# Patient Record
Sex: MALE | Race: WHITE | NOT HISPANIC OR LATINO | URBAN - NONMETROPOLITAN AREA
[De-identification: names, ages, dates, MRNs, and addresses within clinical notes are randomized per-mention and may not be internally consistent; named-entity substitution may affect disease eponyms.]

---

## 2021-11-15 ENCOUNTER — IMPORTED ENCOUNTER (OUTPATIENT)
Dept: URBAN - NONMETROPOLITAN AREA CLINIC 1 | Facility: CLINIC | Age: 61
End: 2021-11-15

## 2021-11-15 PROBLEM — H25.813: Noted: 2021-11-15

## 2021-11-15 PROCEDURE — 99204 OFFICE O/P NEW MOD 45 MIN: CPT

## 2021-11-15 NOTE — PATIENT DISCUSSION
Cataract(s)-Visually significant cataract OU .-Cataract(s) causing symptomatic impairment of visual function not correctable with a tolerable change in glasses or contact lenses lighting or non-operative means resulting in specific activity limitations and/or participation restrictions including but not limited to reading viewing television driving or meeting vocational or recreational needs. -Expectation is clearer vision and functional improvement in symptoms as well as reduced glare disability after cataract removal.-Order IOLMaster and OPD today. -Recommend LRI OD/Toric OS based on today's OPD testing and lifestyle questionnaire.-All questions were answered regarding surgery including pre and post-op medications appointments activity restrictions and anesthetic usage.-The risks benefits and alternatives and special risk factors for the patient were discussed in detail including but not limited to: bleeding infection retinal detachment vitreous loss problems with the implant and possible need for additional surgery.-Although rare the possibility of complete vision loss was discussed.-The possible need for glasses post-operatively was discussed.-Order medical clearance exam based on history of HBP-Patient elects to proceed with cataract surgery OS . Will schedule at patient's convenience and re-evaluate OD  in the future. Discussed lens options. Qualifies for LRI OD & Toric OS. Discussed lens benefits & advised pt need for reading gls. Amblyopia OS discussed guarded prognosis 2nd w/ realistic [facility] expectations.  Start AT & Lotemax TID OU x 1 week & RTC for repeat krystle OU Pt elects LenSx OU

## 2021-11-23 ENCOUNTER — IMPORTED ENCOUNTER (OUTPATIENT)
Dept: URBAN - NONMETROPOLITAN AREA CLINIC 1 | Facility: CLINIC | Age: 61
End: 2021-11-23

## 2021-12-01 ENCOUNTER — IMPORTED ENCOUNTER (OUTPATIENT)
Dept: URBAN - NONMETROPOLITAN AREA CLINIC 1 | Facility: CLINIC | Age: 61
End: 2021-12-01

## 2021-12-01 PROBLEM — H25.813: Noted: 2021-12-01

## 2021-12-01 PROCEDURE — 99213 OFFICE O/P EST LOW 20 MIN: CPT

## 2021-12-02 PROBLEM — Z01.818: Noted: 2021-12-02

## 2021-12-02 PROBLEM — I10: Noted: 2021-12-02

## 2022-02-03 ENCOUNTER — CONSULTATION/EVALUATION (OUTPATIENT)
Dept: RURAL CLINIC 3 | Facility: CLINIC | Age: 62
End: 2022-02-03

## 2022-02-03 VITALS
HEIGHT: 68 IN | BODY MASS INDEX: 25.76 KG/M2 | SYSTOLIC BLOOD PRESSURE: 135 MMHG | HEART RATE: 84 BPM | WEIGHT: 170 LBS | DIASTOLIC BLOOD PRESSURE: 67 MMHG

## 2022-02-03 DIAGNOSIS — H25.13: ICD-10-CM

## 2022-02-03 PROCEDURE — 99214 OFFICE O/P EST MOD 30 MIN: CPT

## 2022-02-03 ASSESSMENT — TONOMETRY
OD_IOP_MMHG: 12
OS_IOP_MMHG: 12

## 2022-02-03 NOTE — PATIENT DISCUSSION
Cataract(s) -Visually significant cataract OU . -Cataract(s) causing symptomatic impairment of visual function not correctable with a tolerable change in glasses or contact lenses, lighting, or non-operative means resulting in specific activity limitations and/or participation restrictions including, but not limited to reading, viewing television, driving, or meeting vocational or recreational needs. -Expectation is clearer vision and functional improvement in symptoms as well as reduced glare disability after cataract removal. -Order IOLMaster and OPD today. -Recommend LRI OD/Toric OS based on today's OPD testing and lifestyle questionnaire. -All questions were answered regarding surgery, including pre and post-op medications, appointments, activity restrictions and anesthetic usage. -The risks, benefits and alternatives, and special risk factors for the patient, were discussed in detail, including but not limited to: bleeding, infection, retinal detachment, vitreous loss, problems with the implant, and possible need for additional surgery. -Although rare, the possibility of complete vision loss was discussed. -The possible need for glasses post-operatively was discussed. -Order medical clearance exam based on history of HBP -Patient elects to proceed with cataract surgery OS . Will schedule at patient's convenience and re-evaluate OD in the future. Discussed lens options. Qualifies for LRI OD & Toric OS. Discussed lens benefits & advised pt need for reading gls. Amblyopia OS, discussed guarded prognosis 2nd w/ realistic [facility] expectations.

## 2022-02-03 NOTE — PATIENT DISCUSSION
Medical Clearance -Medical clearance done today. -No outstanding concerns that would preclude surgery. -Patient is cleared to proceed with scheduled surgery.

## 2022-02-04 ASSESSMENT — VISUAL ACUITY
OS_SC: 20/400
OD_BAT: 20/50
OS_PH: 20/40
OD_SC: 20/400
OD_PAM: 20/70
OD_PH: 20/70
OS_BAT: 20/50
OS_AM: 20/30

## 2022-02-18 ENCOUNTER — SURGERY/PROCEDURE (OUTPATIENT)
Dept: RURAL CLINIC 4 | Facility: CLINIC | Age: 62
End: 2022-02-18

## 2022-02-18 ENCOUNTER — POST-OP (OUTPATIENT)
Dept: RURAL CLINIC 3 | Facility: CLINIC | Age: 62
End: 2022-02-18

## 2022-02-18 DIAGNOSIS — H25.13: ICD-10-CM

## 2022-02-18 DIAGNOSIS — Z98.42: ICD-10-CM

## 2022-02-18 PROCEDURE — 66982 XCAPSL CTRC RMVL CPLX WO ECP: CPT

## 2022-02-18 PROCEDURE — 99024 POSTOP FOLLOW-UP VISIT: CPT

## 2022-02-18 PROCEDURE — TORIC TORIC

## 2022-02-18 PROCEDURE — V2787 ASTIGMATISM-CORRECT FUNCTION: HCPCS

## 2022-02-18 ASSESSMENT — VISUAL ACUITY
OD_SC: 20/200
OS_SC: 20/400

## 2022-02-18 ASSESSMENT — TONOMETRY
OD_IOP_MMHG: 14
OS_IOP_MMHG: 42

## 2022-02-21 ENCOUNTER — POST OP/EVAL OF SECOND EYE (OUTPATIENT)
Dept: RURAL CLINIC 3 | Facility: CLINIC | Age: 62
End: 2022-02-21

## 2022-02-21 VITALS
WEIGHT: 170 LBS | DIASTOLIC BLOOD PRESSURE: 65 MMHG | HEIGHT: 78 IN | SYSTOLIC BLOOD PRESSURE: 134 MMHG | BODY MASS INDEX: 19.67 KG/M2 | HEART RATE: 85 BPM

## 2022-02-21 DIAGNOSIS — Z98.42: ICD-10-CM

## 2022-02-21 PROCEDURE — 99024 POSTOP FOLLOW-UP VISIT: CPT

## 2022-02-21 ASSESSMENT — VISUAL ACUITY
OS_PH: 20/25+
OD_PH: 20/70
OD_BAT: 20/100
OD_SC: 20/400
OS_SC: 20/40
OD_PAM: 20/70

## 2022-02-21 ASSESSMENT — TONOMETRY: OS_IOP_MMHG: 22

## 2022-02-21 NOTE — PATIENT DISCUSSION
(Surgical) Visually Significant (secondary to glare), discussed the risks, benefits, alternatives, and limitations of cataract surgery. The patient stated a full understanding and a desire to proceed with the procedure. The patient will need to return for pre-op appointment with cataract measurements and to have any additional questions answered, and start pre-operative eye drops as directed. Pt elects to proceed with surgery.

## 2022-03-04 ENCOUNTER — POST-OP (OUTPATIENT)
Dept: RURAL CLINIC 3 | Facility: CLINIC | Age: 62
End: 2022-03-04

## 2022-03-04 ENCOUNTER — SURGERY/PROCEDURE (OUTPATIENT)
Dept: RURAL CLINIC 4 | Facility: CLINIC | Age: 62
End: 2022-03-04

## 2022-03-04 DIAGNOSIS — Z98.42: ICD-10-CM

## 2022-03-04 DIAGNOSIS — Z98.41: ICD-10-CM

## 2022-03-04 DIAGNOSIS — H25.11: ICD-10-CM

## 2022-03-04 PROCEDURE — 99024 POSTOP FOLLOW-UP VISIT: CPT

## 2022-03-04 PROCEDURE — 66982 XCAPSL CTRC RMVL CPLX WO ECP: CPT

## 2022-03-04 ASSESSMENT — VISUAL ACUITY
OS_SC: 20/30+2
OD_SC: 20/100
OD_PH: 20/50-1

## 2022-03-04 ASSESSMENT — TONOMETRY
OD_IOP_MMHG: 34
OS_IOP_MMHG: 14

## 2022-03-04 NOTE — PATIENT DISCUSSION
-Pt doing well s/p PCIOL. -Continue post-op gtts according to instruction sheet and sleep with eye shield over eye for 7 nights.-Avoid bending at the waist, lifting anything over 5lbs and dirty or enma environments.-1 drop of Simbrinza instilled in office today.

## 2022-04-10 ASSESSMENT — VISUAL ACUITY
OS_AM: 20/30
OD_CC: 20/400
OS_GLARE: 20/50
OD_GLARE: 20/50
OS_GLARE: 20/50
OS_AM: 20/30
OS_PH: 20/40
OD_CC: 20/20
OD_PAM: 20/70
OD_PH: 20/70
OS_CC: 20/20
OS_CC: 20/400

## 2022-04-10 ASSESSMENT — TONOMETRY
OD_IOP_MMHG: 12
OS_IOP_MMHG: 12